# Patient Record
Sex: FEMALE | Race: BLACK OR AFRICAN AMERICAN | NOT HISPANIC OR LATINO
[De-identification: names, ages, dates, MRNs, and addresses within clinical notes are randomized per-mention and may not be internally consistent; named-entity substitution may affect disease eponyms.]

---

## 2020-09-17 ENCOUNTER — RESULT REVIEW (OUTPATIENT)
Age: 60
End: 2020-09-17

## 2021-01-22 ENCOUNTER — TRANSCRIPTION ENCOUNTER (OUTPATIENT)
Age: 61
End: 2021-01-22

## 2021-02-19 ENCOUNTER — TRANSCRIPTION ENCOUNTER (OUTPATIENT)
Age: 61
End: 2021-02-19

## 2022-02-15 PROBLEM — Z00.00 ENCOUNTER FOR PREVENTIVE HEALTH EXAMINATION: Status: ACTIVE | Noted: 2022-02-15

## 2022-03-03 ENCOUNTER — APPOINTMENT (OUTPATIENT)
Dept: NEUROLOGY | Facility: CLINIC | Age: 62
End: 2022-03-03
Payer: COMMERCIAL

## 2022-03-03 VITALS
WEIGHT: 125 LBS | HEIGHT: 62 IN | BODY MASS INDEX: 23 KG/M2 | TEMPERATURE: 98 F | SYSTOLIC BLOOD PRESSURE: 121 MMHG | OXYGEN SATURATION: 98 % | HEART RATE: 74 BPM | DIASTOLIC BLOOD PRESSURE: 79 MMHG

## 2022-03-03 DIAGNOSIS — Z88.5 ALLERGY STATUS TO NARCOTIC AGENT: ICD-10-CM

## 2022-03-03 PROCEDURE — 99204 OFFICE O/P NEW MOD 45 MIN: CPT

## 2022-04-06 ENCOUNTER — FORM ENCOUNTER (OUTPATIENT)
Age: 62
End: 2022-04-06

## 2022-06-14 ENCOUNTER — APPOINTMENT (OUTPATIENT)
Dept: NEUROLOGY | Facility: CLINIC | Age: 62
End: 2022-06-14

## 2022-09-08 ENCOUNTER — APPOINTMENT (OUTPATIENT)
Dept: NEUROLOGY | Facility: CLINIC | Age: 62
End: 2022-09-08

## 2022-09-08 VITALS
SYSTOLIC BLOOD PRESSURE: 110 MMHG | OXYGEN SATURATION: 98 % | HEIGHT: 62 IN | RESPIRATION RATE: 16 BRPM | WEIGHT: 139 LBS | TEMPERATURE: 97.9 F | BODY MASS INDEX: 25.58 KG/M2 | HEART RATE: 72 BPM | DIASTOLIC BLOOD PRESSURE: 68 MMHG

## 2022-09-08 DIAGNOSIS — G47.00 INSOMNIA, UNSPECIFIED: ICD-10-CM

## 2022-09-08 DIAGNOSIS — G93.9 DISORDER OF BRAIN, UNSPECIFIED: ICD-10-CM

## 2022-09-08 PROCEDURE — 99213 OFFICE O/P EST LOW 20 MIN: CPT

## 2022-09-08 RX ORDER — METOCLOPRAMIDE 5 MG/1
5 TABLET ORAL
Qty: 60 | Refills: 0 | Status: DISCONTINUED | COMMUNITY
Start: 2022-03-03 | End: 2022-09-08

## 2022-09-08 RX ORDER — NAPROXEN 500 MG/1
500 TABLET ORAL
Qty: 60 | Refills: 5 | Status: DISCONTINUED | COMMUNITY
Start: 2022-03-03 | End: 2022-09-08

## 2022-09-08 RX ORDER — DICLOFENAC POTASSIUM 50 MG/1
50 TABLET, COATED ORAL
Qty: 60 | Refills: 6 | Status: ACTIVE | COMMUNITY
Start: 2022-09-08 | End: 1900-01-01

## 2022-09-23 ENCOUNTER — RESULT REVIEW (OUTPATIENT)
Age: 62
End: 2022-09-23

## 2022-11-15 ENCOUNTER — LABORATORY RESULT (OUTPATIENT)
Age: 62
End: 2022-11-15

## 2022-11-15 ENCOUNTER — APPOINTMENT (OUTPATIENT)
Dept: OTOLARYNGOLOGY | Facility: CLINIC | Age: 62
End: 2022-11-15

## 2022-11-15 VITALS — BODY MASS INDEX: 23.92 KG/M2 | HEIGHT: 62 IN | TEMPERATURE: 97.3 F | WEIGHT: 130 LBS

## 2022-11-15 DIAGNOSIS — E06.9 THYROIDITIS, UNSPECIFIED: ICD-10-CM

## 2022-11-15 DIAGNOSIS — Z78.9 OTHER SPECIFIED HEALTH STATUS: ICD-10-CM

## 2022-11-15 DIAGNOSIS — Z80.9 FAMILY HISTORY OF MALIGNANT NEOPLASM, UNSPECIFIED: ICD-10-CM

## 2022-11-15 PROCEDURE — 31575 DIAGNOSTIC LARYNGOSCOPY: CPT

## 2022-11-15 PROCEDURE — 99203 OFFICE O/P NEW LOW 30 MIN: CPT | Mod: 25

## 2022-11-15 NOTE — HISTORY OF PRESENT ILLNESS
[de-identified] : RADHA VALENTINE is a 62 year old patient with a 3-week history of anterior neck pain.  She has tenderness on palpation and swelling over the area of the thyroid.  Her symptoms are worse at night.  She may have mild dysphagia when it is very bad.  She has no voice change or cough.  She denies a history of thyroid disease.  She does not suffer from reflux.  She also has right facial pain/headaches, ear pain, nasal congestion, nasal drainage.  She was given Flonase but has not yet started.  She does suffer from migraines.  She takes diclofenac as needed.  She denies a history of TMJ dysfunction

## 2022-11-15 NOTE — ASSESSMENT
[FreeTextEntry1] : She has a few week history of anterior neck pain.  She may have thyroiditis.  She is tender on palpation of the gland.  Flexible laryngoscopy showed reflux related laryngeal changes but was otherwise unremarkable.\par \par She also has right-sided facial pain, ear pain, nasal congestion, nasal drainage.  On exam, she had a deviated septum as well as mucosal edema.  There is no obvious sinus infection.  Her symptoms could be due to migraines.\par \par Plan\par -Findings and management options were discussed with the patient.\par -Nasal saline irrigations, nasal steroid spray, and antihistamine or decongestant as needed\par -Reflux precautions.  I also recommended OTC Pepcid\par -I am sending her for NUSRAT, ESR, and thyroid function testing.  We may also consider an ultrasound of the thyroid\par -She will take diclofenac as needed.  I have recommended she take Pepcid while on it.  If it does not help, we may consider a short burst of steroids\par -Follow-up in 1 week.\par -Call and return earlier if any concerns or worsening symptoms

## 2022-11-15 NOTE — CONSULT LETTER
[Dear  ___] : Dear  [unfilled], [Consult Letter:] : I had the pleasure of evaluating your patient, [unfilled]. [Please see my note below.] : Please see my note below. [Consult Closing:] : Thank you very much for allowing me to participate in the care of this patient.  If you have any questions, please do not hesitate to contact me. [Sincerely,] : Sincerely, [FreeTextEntry3] : Gracie Rivera MD\par

## 2022-11-18 LAB
ANA SER IF-ACNC: NEGATIVE
ERYTHROCYTE [SEDIMENTATION RATE] IN BLOOD BY WESTERGREN METHOD: 79 MM/HR
T3 SERPL-MCNC: 130 NG/DL
T4 SERPL-MCNC: 12 UG/DL
THYROPEROXIDASE AB SERPL IA-ACNC: 16 IU/ML
TSH SERPL-ACNC: 0.39 UIU/ML

## 2022-11-22 ENCOUNTER — APPOINTMENT (OUTPATIENT)
Dept: OTOLARYNGOLOGY | Facility: CLINIC | Age: 62
End: 2022-11-22

## 2022-11-22 VITALS — WEIGHT: 130 LBS | BODY MASS INDEX: 23.92 KG/M2 | HEIGHT: 62 IN

## 2022-11-22 DIAGNOSIS — R51.9 HEADACHE, UNSPECIFIED: ICD-10-CM

## 2022-11-22 DIAGNOSIS — M54.2 CERVICALGIA: ICD-10-CM

## 2022-11-22 DIAGNOSIS — K21.9 GASTRO-ESOPHAGEAL REFLUX DISEASE W/OUT ESOPHAGITIS: ICD-10-CM

## 2022-11-22 DIAGNOSIS — G89.29 HEADACHE, UNSPECIFIED: ICD-10-CM

## 2022-11-22 PROCEDURE — 99213 OFFICE O/P EST LOW 20 MIN: CPT

## 2022-11-22 RX ORDER — OMEGA-3/DHA/EPA/FISH OIL 300-1000MG
CAPSULE ORAL
Refills: 0 | Status: ACTIVE | COMMUNITY

## 2022-11-22 NOTE — CONSULT LETTER
[Dear  ___] : Dear  [unfilled], [Courtesy Letter:] : I had the pleasure of seeing your patient, [unfilled], in my office today. [Please see my note below.] : Please see my note below. [Consult Closing:] : Thank you very much for allowing me to participate in the care of this patient.  If you have any questions, please do not hesitate to contact me. [Sincerely,] : Sincerely, [FreeTextEntry3] : Gracie Rivera MD\par

## 2022-11-22 NOTE — HISTORY OF PRESENT ILLNESS
[de-identified] : RADHA VALENTINE is a 62 year old patient here for follow-up for a 4-week history of anterior neck pain.  She still has discomfort when she touches just to the right of midline of the anterior neck.  It seems to be more localized.  She has been on diclofenac as well as Pepcid for reflux.  If she takes the medications, the pain improves.  She is not sure if she takes the Pepcid alone if the pain will resolve.  She also continues to have right facial pain/headaches.  She does suffer from migraines.  She had reflux related laryngeal changes on flexible laryngoscopy at her last visit.  Laboratory testing was normal except for an elevated ESR.

## 2022-11-28 ENCOUNTER — NON-APPOINTMENT (OUTPATIENT)
Age: 62
End: 2022-11-28

## 2022-11-29 ENCOUNTER — NON-APPOINTMENT (OUTPATIENT)
Age: 62
End: 2022-11-29

## 2022-11-29 DIAGNOSIS — E07.9 DISORDER OF THYROID, UNSPECIFIED: ICD-10-CM

## 2022-12-07 ENCOUNTER — NON-APPOINTMENT (OUTPATIENT)
Age: 62
End: 2022-12-07

## 2022-12-27 ENCOUNTER — NON-APPOINTMENT (OUTPATIENT)
Age: 62
End: 2022-12-27

## 2023-01-09 ENCOUNTER — APPOINTMENT (OUTPATIENT)
Dept: ENDOCRINOLOGY | Facility: CLINIC | Age: 63
End: 2023-01-09

## 2023-02-15 ENCOUNTER — APPOINTMENT (OUTPATIENT)
Dept: ENDOCRINOLOGY | Facility: CLINIC | Age: 63
End: 2023-02-15

## 2023-03-01 NOTE — ASSESSMENT
----- Message from Shira Salmeron sent at 3/1/2023  4:18 PM CST -----  Regarding: Refill  Contact: Anupama [phone]  [facility] called requesting clinical questions needed for an appeal for ( Prigive) Please call to discuss further       Fax [phone]     [FreeTextEntry1] : She continues to have anterior neck pain although the diclofenac and Pepcid are helping.  It is unclear which medication is helping more.  She has tenderness which appears to be more localized to the right side of the thyroid area.  There could be a small nodule.  She does suffer from migraines and has had right-sided facial pain and headaches.  Laboratory testing was normal except for an elevated ESR\par \par Plan\par -Findings and management options were discussed with the patient.\par -Nasal steroid spray and antihistamine or decongestant as needed\par -Continue reflux precautions and Pepcid.  She may try taking the Pepcid alone to see if that helps with the discomfort.\par -We discussed obtaining an imaging study such as a CT scan or ultrasound.  She would like to just start with getting an ultrasound.  She will be sent for this\par -She should follow-up with a neurologist for management of the migraines\par -She may consider endocrinology evaluation as well if there is concern for thyroiditis\par -I will see her back or speak with her after the ultrasound\par -Call and return earlier if any concerns or worsening symptoms

## 2023-03-09 ENCOUNTER — APPOINTMENT (OUTPATIENT)
Dept: NEUROLOGY | Facility: CLINIC | Age: 63
End: 2023-03-09

## 2023-05-04 ENCOUNTER — APPOINTMENT (OUTPATIENT)
Dept: NEUROLOGY | Facility: CLINIC | Age: 63
End: 2023-05-04
Payer: COMMERCIAL

## 2023-05-04 VITALS
OXYGEN SATURATION: 98 % | WEIGHT: 137 LBS | HEART RATE: 66 BPM | BODY MASS INDEX: 25.21 KG/M2 | RESPIRATION RATE: 16 BRPM | SYSTOLIC BLOOD PRESSURE: 121 MMHG | HEIGHT: 62 IN | TEMPERATURE: 97.5 F | DIASTOLIC BLOOD PRESSURE: 77 MMHG

## 2023-05-04 PROCEDURE — 99213 OFFICE O/P EST LOW 20 MIN: CPT

## 2023-05-04 NOTE — DISCUSSION/SUMMARY
[FreeTextEntry1] : 61 yo F presents for evaluation of migraines\par 4/7/2022 MRI brain w/out contrast scattered white matter changes and left middle fossa arachnoid cyst\par \par plan:\par Medrol dose pack for current migraine\par Trial of Nurtex PRN\par Sleep eval again recommended\par Discussed lifestyle factors\par F/u in 6 months or sooner if needed

## 2023-05-04 NOTE — DATA REVIEWED
[de-identified] : 4/7/2022 MRI brain w/out contrast\par 3 cm in its longer axis left middle fossa arachnoid cyst with no mass effect of significance in the left temporal lobe\par Scattered areas of gliosis in the white matter hemispheres sequela most probably to chronic migraines, hypertension or small vessels arteriosclerosis.

## 2023-05-04 NOTE — PHYSICAL EXAM
[General Appearance - Alert] : alert [General Appearance - In No Acute Distress] : in no acute distress [Oriented To Time, Place, And Person] : oriented to person, place, and time [Person] : oriented to person [Place] : oriented to place [Time] : oriented to time [Fluency] : fluency intact [Cranial Nerves Optic (II)] : visual acuity intact bilaterally,  visual fields full to confrontation, pupils equal round and reactive to light [Cranial Nerves Oculomotor (III)] : extraocular motion intact [Cranial Nerves Trigeminal (V)] : facial sensation intact symmetrically [Cranial Nerves Facial (VII)] : face symmetrical [Cranial Nerves Vestibulocochlear (VIII)] : hearing was intact bilaterally [Cranial Nerves Glossopharyngeal (IX)] : tongue and palate midline [Cranial Nerves Accessory (XI - Cranial And Spinal)] : head turning and shoulder shrug symmetric [Cranial Nerves Hypoglossal (XII)] : there was no tongue deviation with protrusion [Motor Tone] : muscle tone was normal in all four extremities [Motor Strength] : muscle strength was normal in all four extremities [Abnormal Walk] : normal gait [Balance] : balance was intact [Romberg's Sign] : Romberg's sign was negtive [Coordination - Dysmetria Impaired Finger-to-Nose Bilateral] : not present

## 2023-05-04 NOTE — HISTORY OF PRESENT ILLNESS
[FreeTextEntry1] : Interim Hx: 5/4/2023\par \par Migraines have been more frequent.\par Currently has one, on day 3. Not responding to meds.\par \par Diclofenac sometimes helps. Still takes some Excedrin as well.\par Did not see sleep specialist\par Had been taking Apple cider vinegar for allergies but recently stopped.\par \par __________________\par Interim Hx: 9/8/2022\par \par Continues to have headaches\par Naprosyn not very effective\par \par sleep is poor \par \par 4/7/2022 3 cmm left middle fossa arachnid cyst with no mass effect \par _________________\par Initial Hx: 3/3/2022\par 60 yo F presents for evaluation of migraines\par \par Started about 12 years ago\par Description- typically left sided but can be right, also radiates to back, throbbing/stabbing pain\par Frequency- every 4-6 months, minor headaches slightly more frequently   \par Duration- can lasts for 3-4 days\par No visual aura\par A/w light sensitivity more recently, nausea  \par Triggers- stress, diet (rice), dehydration\par med trial- Imitrex (side effects palpitations), Fioricet\par Acupuncture, mindfulness, Advil cold/sinus helps \par Has been on Magnesium, Vitamin B2 since migraines started\par \par \par Will end up in ER about 1x/year for bad migraine. Last time was few months ago\par Migraine cocktail effective- Reglan, Toradol, Benadryl IV\par \par Previously following with Neurologist at [facility]- recently retired\par Brain lesion on previous brain imaging completed- unclear type. 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 Rare alcohol

## 2023-05-12 RX ORDER — RIMEGEPANT SULFATE 75 MG/75MG
TABLET, ORALLY DISINTEGRATING ORAL DAILY
Qty: 8 | Refills: 3 | Status: ACTIVE | COMMUNITY
Start: 2023-05-04 | End: 1900-01-01

## 2023-08-26 ENCOUNTER — APPOINTMENT (OUTPATIENT)
Dept: AFTER HOURS CARE | Facility: EMERGENCY ROOM | Age: 63
End: 2023-08-26
Payer: COMMERCIAL

## 2023-08-26 DIAGNOSIS — R30.0 DYSURIA: ICD-10-CM

## 2023-08-26 PROCEDURE — 99203 OFFICE O/P NEW LOW 30 MIN: CPT | Mod: 95

## 2023-08-26 RX ORDER — CEPHALEXIN 500 MG/1
500 TABLET ORAL
Qty: 14 | Refills: 0 | Status: ACTIVE | COMMUNITY
Start: 2023-08-26 | End: 1900-01-01

## 2023-08-26 RX ORDER — PHENAZOPYRIDINE 200 MG/1
200 TABLET, FILM COATED ORAL 3 TIMES DAILY
Qty: 6 | Refills: 0 | Status: ACTIVE | COMMUNITY
Start: 2023-08-26 | End: 1900-01-01

## 2023-08-26 NOTE — PHYSICAL EXAM
[No Acute Distress] : no acute distress [Well-Appearing] : well-appearing [No Respiratory Distress] : no respiratory distress  [Soft] : abdomen soft [de-identified] : EXAM: NAD, nt @ bilateral flank, ttp @ suprapubic

## 2023-08-26 NOTE — REVIEW OF SYSTEMS
[Fever] : no fever [Chills] : no chills [Chest Pain] : no chest pain [Shortness Of Breath] : no shortness of breath [Abdominal Pain] : abdominal pain [Nausea] : no nausea [Vomiting] : no vomiting [Dysuria] : dysuria [Hematuria] : no hematuria [Frequency] : frequency [Headache] : no headache

## 2023-08-26 NOTE — ASSESSMENT
[FreeTextEntry1] : 62yoF p/w dysuria, likely UTI. unable to go to lab to drop ua/ucx at this time. -abx -pyridium -f/up with pmd on monday to collect ua/ucx -seek immediate attention if any severae abd pain, flank pain, vomiting, fever, or any other acute causes of concern.

## 2023-08-26 NOTE — HISTORY OF PRESENT ILLNESS
[Home] : at home, [unfilled] , at the time of the visit. [Other Location: e.g. Home (Enter Location, City,State)___] : at [unfilled] [Verbal consent obtained from patient] : the patient, [unfilled] [FreeTextEntry8] : 62yoF; with no signif PMH: now p/w dysuria, frequency, urgency x2 days.  denies f/c/s. denies n/v. c/o abd pain--suprapubic pressure, non-radiating. denies flank pain.  denies hematuria.

## 2023-08-26 NOTE — PLAN
[With new medications prescribed] : Treat in place: with new medications prescribed [FreeTextEntry1] : 62yoF p/w dysuria, likely UTI. unable to go to lab to drop ua/ucx at this time. -abx -pyridium -f/up with pmd on monday to collect ua/ucx -seek immediate attention if any severae abd pain, flank pain, vomiting, fever, or any other acute causes of concern.

## 2023-11-16 ENCOUNTER — APPOINTMENT (OUTPATIENT)
Dept: NEUROLOGY | Facility: CLINIC | Age: 63
End: 2023-11-16
Payer: COMMERCIAL

## 2023-11-16 VITALS
BODY MASS INDEX: 26.31 KG/M2 | OXYGEN SATURATION: 98 % | WEIGHT: 143 LBS | HEIGHT: 62 IN | DIASTOLIC BLOOD PRESSURE: 75 MMHG | HEART RATE: 81 BPM | TEMPERATURE: 97.2 F | SYSTOLIC BLOOD PRESSURE: 123 MMHG

## 2023-11-16 PROCEDURE — 99213 OFFICE O/P EST LOW 20 MIN: CPT

## 2024-03-22 ENCOUNTER — OUTPATIENT (OUTPATIENT)
Dept: OUTPATIENT SERVICES | Facility: HOSPITAL | Age: 64
LOS: 1 days | End: 2024-03-22

## 2024-03-22 ENCOUNTER — APPOINTMENT (OUTPATIENT)
Dept: RADIOLOGY | Facility: CLINIC | Age: 64
End: 2024-03-22
Payer: COMMERCIAL

## 2024-03-22 PROCEDURE — 77080 DXA BONE DENSITY AXIAL: CPT | Mod: 26

## 2024-04-24 ENCOUNTER — APPOINTMENT (OUTPATIENT)
Dept: NEUROLOGY | Facility: CLINIC | Age: 64
End: 2024-04-24
Payer: COMMERCIAL

## 2024-04-24 DIAGNOSIS — G43.009 MIGRAINE W/OUT AURA, NOT INTRACTABLE, W/OUT STATUS MIGRAINOSUS: ICD-10-CM

## 2024-04-24 PROCEDURE — 99213 OFFICE O/P EST LOW 20 MIN: CPT | Mod: 95

## 2024-04-24 RX ORDER — METHYLPREDNISOLONE 4 MG/1
4 TABLET ORAL
Qty: 1 | Refills: 1 | Status: DISCONTINUED | COMMUNITY
Start: 2023-05-04 | End: 2024-04-24

## 2024-05-01 NOTE — HISTORY OF PRESENT ILLNESS
[Home] : at home, [unfilled] , at the time of the visit. [Medical Office: ([facility])___] : at the medical office located in  [Verbal consent obtained from patient] : the patient, [unfilled] [FreeTextEntry1] : Interim Hx: 4/24/2024 (televisit)  Increase in headaches over the past 3 weeks, possibly related to weather  She did have an isolated fever yesterday daughter tested positive for covid today  ____________________ Interim Hx: 11/16/2023  Overall has been doing well. Has not had a migraine in months. Did not take medrol dose eusebio after last visit. Tried nurtec twice which she felt helped migraine.  ________________ Interim Hx: 5/4/2023  Migraines have been more frequent. Currently has one, on day 3. Not responding to meds.  Diclofenac sometimes helps. Still takes some Excedrin as well. Did not see sleep specialist Had been taking Apple cider vinegar for allergies but recently stopped.  __________________ Interim Hx: 9/8/2022  Continues to have headaches Naprosyn not very effective  sleep is poor   4/7/2022 3 cmm left middle fossa arachnid cyst with no mass effect  _________________ Initial Hx: 3/3/2022 60 yo F presents for evaluation of migraines  Started about 12 years ago Description- typically left sided but can be right, also radiates to back, throbbing/stabbing pain Frequency- every 4-6 months, minor headaches slightly more frequently    Duration- can lasts for 3-4 days No visual aura A/w light sensitivity more recently, nausea   Triggers- stress, diet (rice), dehydration med trial- Imitrex (side effects palpitations), Fioricet Acupuncture, mindfulness, Advil cold/sinus helps  Has been on Magnesium, Vitamin B2 since migraines started   Will end up in ER about 1x/year for bad migraine. Last time was few months ago Migraine cocktail effective- Reglan, Toradol, Benadryl IV  Previously following with Neurologist at [facility]- recently retired Brain lesion on previous brain imaging completed- unclear type. 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 Rare alcohol

## 2024-05-01 NOTE — DISCUSSION/SUMMARY
[FreeTextEntry1] : 62 yo F presents for evaluation of migraines 4/7/2022 MRI brain w/out contrast scattered white matter changes and left middle fossa arachnoid cyst  plan: Continue Nurtec PRN continue to track headaches- if frequency remains high will consider preventative medication F/u in 6 months or sooner if needed

## 2024-05-01 NOTE — DATA REVIEWED
[de-identified] : 4/7/2022 MRI brain w/out contrast\par  3 cm in its longer axis left middle fossa arachnoid cyst with no mass effect of significance in the left temporal lobe\par  Scattered areas of gliosis in the white matter hemispheres sequela most probably to chronic migraines, hypertension or small vessels arteriosclerosis.

## 2024-05-06 ENCOUNTER — APPOINTMENT (OUTPATIENT)
Dept: NEUROLOGY | Facility: CLINIC | Age: 64
End: 2024-05-06

## 2024-08-23 ENCOUNTER — APPOINTMENT (OUTPATIENT)
Dept: ORTHOPEDIC SURGERY | Facility: CLINIC | Age: 64
End: 2024-08-23
Payer: COMMERCIAL

## 2024-08-23 DIAGNOSIS — M85.80 OTHER SPECIFIED DISORDERS OF BONE DENSITY AND STRUCTURE, UNSPECIFIED SITE: ICD-10-CM

## 2024-08-23 DIAGNOSIS — M67.952 UNSPECIFIED DISORDER OF SYNOVIUM AND TENDON, LEFT THIGH: ICD-10-CM

## 2024-08-23 PROCEDURE — 72170 X-RAY EXAM OF PELVIS: CPT

## 2024-08-23 PROCEDURE — 99203 OFFICE O/P NEW LOW 30 MIN: CPT

## 2024-08-23 NOTE — ASSESSMENT
[FreeTextEntry1] : 63-year-old woman with left lateral pelvic pain consistent with some gluteal tendinitis or bursitis at the origin of the gluteal muscles on the posterior pelvis.  I gave her home exercises with stretching and strengthening.  She can apply heat and ice.  It can help to take some NSAIDs like ibuprofen or Aleve consistently for a few weeks if it is not improving.  I referred her to physical therapy. If it is not getting better we may want to try steroid injection. With the changes seen on x-ray I would like to get a follow-up x-ray in 4 months to see if it is stable or changing.  I did not think it looked like Paget's disease but want to follow-up. We also talked about the results of her DEXA scan which showed osteopenia.  We talked about diet and getting enough calcium and vitamin D and doing weightbearing exercise to try to maintain or prevent further loss which can occur with aging. Typically follow-up may be done in 2 to 5 years.

## 2024-08-23 NOTE — HISTORY OF PRESENT ILLNESS
[de-identified] : 63-year-old woman comes in today for evaluation of burning pain in her left lateral pelvis/hip when sitting and when lying on her side.  This started about 2 months ago without any specific injury or inciting event.. Pain is localized to lateral hip. No groin pain.  No midline back pain.  No radicular pain. Pain can be about 4 out of 10 and intermittent.  She just saw her internist.  She has not had x-rays and no treatment.  She is not taking medication for it.  She prefers to treat things naturally if possible.  She recently had a DEXA scan and was hoping to go over the results of that as well.  She has not had any trauma or injury.

## 2024-08-23 NOTE — HISTORY OF PRESENT ILLNESS
[de-identified] : 63-year-old woman comes in today for evaluation of burning pain in her left lateral pelvis/hip when sitting and when lying on her side.  This started about 2 months ago without any specific injury or inciting event.. Pain is localized to lateral hip. No groin pain.  No midline back pain.  No radicular pain. Pain can be about 4 out of 10 and intermittent.  She just saw her internist.  She has not had x-rays and no treatment.  She is not taking medication for it.  She prefers to treat things naturally if possible.  She recently had a DEXA scan and was hoping to go over the results of that as well.  She has not had any trauma or injury.

## 2024-08-23 NOTE — PHYSICAL EXAM
[LE] : Sensory: Intact in bilateral lower extremities [Normal RLE] : Right Lower Extremity: No scars, rashes, lesions, ulcers, skin intact [Normal LLE] : Left Lower Extremity: No scars, rashes, lesions, ulcers, skin intact [Normal Touch] : sensation intact for touch [Normal] : Oriented to person, place, and time, insight and judgement were intact and the affect was normal [de-identified] : LEFT hip/pelvis She walks with a normal gait. Normal lumbar range of motion. There is focal tenderness over the left lateral pelvis just inferior to the posterior superior iliac crest.  No other tenderness throughout the pelvis or hip or low back. No pain or limitation with hip range of motion bilaterally.  Negative Barry. 5/5 straight leg raise and hip abduction against resistance.  Normal neurovascular exam distally. [de-identified] :  AP pelvic x-ray shows intact hip joints.  Minimal degenerative changes seen in the lower lumbar spine.  There is some traction spurring along the lateral ilium on the left greater than right side but present bilaterally..  There does not appear to be changes that would be consistent with Paget's.  No increased density of the bone of the ileum.  I reviewed the DEXA scan which is consistent with osteopenia with a T-score of the in the femoral neck at -1.8.  Other areas are more normal.

## 2024-08-23 NOTE — PHYSICAL EXAM
[LE] : Sensory: Intact in bilateral lower extremities [Normal RLE] : Right Lower Extremity: No scars, rashes, lesions, ulcers, skin intact [Normal LLE] : Left Lower Extremity: No scars, rashes, lesions, ulcers, skin intact [Normal Touch] : sensation intact for touch [Normal] : Oriented to person, place, and time, insight and judgement were intact and the affect was normal [de-identified] : LEFT hip/pelvis She walks with a normal gait. Normal lumbar range of motion. There is focal tenderness over the left lateral pelvis just inferior to the posterior superior iliac crest.  No other tenderness throughout the pelvis or hip or low back. No pain or limitation with hip range of motion bilaterally.  Negative Barry. 5/5 straight leg raise and hip abduction against resistance.  Normal neurovascular exam distally. [de-identified] :  AP pelvic x-ray shows intact hip joints.  Minimal degenerative changes seen in the lower lumbar spine.  There is some traction spurring along the lateral ilium on the left greater than right side but present bilaterally..  There does not appear to be changes that would be consistent with Paget's.  No increased density of the bone of the ileum.  I reviewed the DEXA scan which is consistent with osteopenia with a T-score of the in the femoral neck at -1.8.  Other areas are more normal.

## 2024-12-25 PROBLEM — F10.90 ALCOHOL USE: Status: ACTIVE | Noted: 2022-11-15

## 2025-03-03 ENCOUNTER — APPOINTMENT (OUTPATIENT)
Dept: ORTHOPEDIC SURGERY | Facility: CLINIC | Age: 65
End: 2025-03-03
Payer: COMMERCIAL

## 2025-03-03 DIAGNOSIS — M67.952 UNSPECIFIED DISORDER OF SYNOVIUM AND TENDON, LEFT THIGH: ICD-10-CM

## 2025-03-03 PROCEDURE — 99213 OFFICE O/P EST LOW 20 MIN: CPT

## 2025-03-03 PROCEDURE — 72170 X-RAY EXAM OF PELVIS: CPT

## 2025-05-01 ENCOUNTER — APPOINTMENT (OUTPATIENT)
Dept: OTOLARYNGOLOGY | Facility: CLINIC | Age: 65
End: 2025-05-01
Payer: COMMERCIAL

## 2025-05-01 DIAGNOSIS — K21.9 GASTRO-ESOPHAGEAL REFLUX DISEASE W/OUT ESOPHAGITIS: ICD-10-CM

## 2025-05-01 DIAGNOSIS — Z91.09 OTHER ALLERGY STATUS, OTHER THAN TO DRUGS AND BIOLOGICAL SUBSTANCES: ICD-10-CM

## 2025-05-01 PROCEDURE — 31575 DIAGNOSTIC LARYNGOSCOPY: CPT

## 2025-05-01 PROCEDURE — 99213 OFFICE O/P EST LOW 20 MIN: CPT | Mod: 25

## 2025-05-01 RX ORDER — AMOXICILLIN AND CLAVULANATE POTASSIUM 875; 125 MG/1; MG/1
875-125 TABLET, COATED ORAL
Qty: 14 | Refills: 0 | Status: ACTIVE | COMMUNITY
Start: 2025-05-01 | End: 1900-01-01

## 2025-05-05 ENCOUNTER — APPOINTMENT (OUTPATIENT)
Dept: ORTHOPEDIC SURGERY | Facility: CLINIC | Age: 65
End: 2025-05-05
Payer: COMMERCIAL

## 2025-05-05 DIAGNOSIS — M25.562 PAIN IN RIGHT KNEE: ICD-10-CM

## 2025-05-05 DIAGNOSIS — M25.561 PAIN IN RIGHT KNEE: ICD-10-CM

## 2025-05-05 DIAGNOSIS — G89.29 PAIN IN RIGHT KNEE: ICD-10-CM

## 2025-05-05 DIAGNOSIS — M67.952 UNSPECIFIED DISORDER OF SYNOVIUM AND TENDON, LEFT THIGH: ICD-10-CM

## 2025-05-05 PROCEDURE — 99213 OFFICE O/P EST LOW 20 MIN: CPT

## 2025-05-06 ENCOUNTER — APPOINTMENT (OUTPATIENT)
Dept: OTOLARYNGOLOGY | Facility: CLINIC | Age: 65
End: 2025-05-06
Payer: COMMERCIAL

## 2025-05-06 DIAGNOSIS — H92.01 OTALGIA, RIGHT EAR: ICD-10-CM

## 2025-05-06 DIAGNOSIS — K11.20 SIALOADENITIS, UNSPECIFIED: ICD-10-CM

## 2025-05-06 PROCEDURE — 99213 OFFICE O/P EST LOW 20 MIN: CPT

## 2025-08-18 ENCOUNTER — APPOINTMENT (OUTPATIENT)
Dept: ORTHOPEDIC SURGERY | Facility: CLINIC | Age: 65
End: 2025-08-18
Payer: COMMERCIAL

## 2025-08-18 DIAGNOSIS — M54.16 RADICULOPATHY, LUMBAR REGION: ICD-10-CM

## 2025-08-18 DIAGNOSIS — M47.816 SPONDYLOSIS W/OUT MYELOPATHY OR RADICULOPATHY, LUMBAR REGION: ICD-10-CM

## 2025-08-18 PROCEDURE — 99214 OFFICE O/P EST MOD 30 MIN: CPT

## 2025-08-18 PROCEDURE — 72100 X-RAY EXAM L-S SPINE 2/3 VWS: CPT

## 2025-08-18 RX ORDER — ETODOLAC 400 MG/1
400 TABLET, FILM COATED ORAL TWICE DAILY
Qty: 30 | Refills: 0 | Status: ACTIVE | COMMUNITY
Start: 2025-08-18 | End: 1900-01-01

## 2025-09-05 ENCOUNTER — NON-APPOINTMENT (OUTPATIENT)
Age: 65
End: 2025-09-05

## 2025-09-05 ENCOUNTER — APPOINTMENT (OUTPATIENT)
Dept: ORTHOPEDIC SURGERY | Facility: CLINIC | Age: 65
End: 2025-09-05
Payer: COMMERCIAL

## 2025-09-05 DIAGNOSIS — M54.16 RADICULOPATHY, LUMBAR REGION: ICD-10-CM

## 2025-09-05 DIAGNOSIS — M51.369: ICD-10-CM

## 2025-09-05 PROCEDURE — 99213 OFFICE O/P EST LOW 20 MIN: CPT

## 2025-09-05 RX ORDER — METHYLPREDNISOLONE 4 MG/1
4 TABLET ORAL
Qty: 1 | Refills: 0 | Status: ACTIVE | COMMUNITY
Start: 2025-09-05 | End: 1900-01-01